# Patient Record
(demographics unavailable — no encounter records)

---

## 2024-10-09 NOTE — PLAN
[FreeTextEntry1] : c/w specialist recommendations. Refer to PT. Pt advised to sign up for [facility] portal to review labs and communicate any questions or concerns directly. Yearly physical and return as needed for illness, medication refills, and new or existing complaints.

## 2024-10-09 NOTE — PHYSICAL EXAM
[No Acute Distress] : no acute distress [Well-Appearing] : well-appearing [No Respiratory Distress] : no respiratory distress  [No Accessory Muscle Use] : no accessory muscle use [Coordination Grossly Intact] : coordination grossly intact [No Focal Deficits] : no focal deficits [Normal Gait] : normal gait [Normal Affect] : the affect was normal [Normal Insight/Judgement] : insight and judgment were intact [de-identified] : right foot boot

## 2024-10-09 NOTE — HISTORY OF PRESENT ILLNESS
[de-identified] : 86-year-old female with past medical history of diabetes type 2, single kidney s/p cancer, and hypertension presents for follow up after ER visit for broken foot. Pt denies CP/SOB, fever/chills, n/v/d/c.

## 2025-02-13 NOTE — HISTORY OF PRESENT ILLNESS
[Patient] : patient [Family Member] : family member [FreeTextEntry2] : CHECO INFANTE is a 86 year F presenting as a new patient to establish care. Accompanied by daughter in law, Dotty ()   Follows with: PCP Dr Tristen Lozano Endocrinology - Dr Rosas Neurology - PA Dotty Ophtho Podiatry Gyn and cardiology - PRN   Active medical problems: DM2 HTN HLD CVA in 2012 c/b L foot fracture c/b Drop foot  - has tried AFO in past and doesnt like them.  osteoporosis - foot fracture Hx kidney cancer - s/p nephrectomy Hyperkalemia Anemia  Anxiety and depression - does not like to go out of a specific region.  GERD Neuropathy OA RBBB   Recent hospitalizations: One year ago - Panic attack with HTN   Past surgical hx: cataract on L L nephrectomy cholecystectomy hysterectomy and oophorectomy appendectomy   Medications: pregabalin 50mg at bedtime Mirtazapine 15mg daily  atorvastatin 20mg daily  losartan 100mg daily  amlodipine 5mg daily  metformin 1g BID  januvia 100mg daily  escitalopram 5mg daily  iron 325mg daily pantoprazole 40mg daily   Primary Pharmacy: [facility] on [address] [address] [facility]   Allergies:  NKDA   Family history: Father and sister - MI Mother - breast cancer   Immunizations: flu shot - does not want PNA - did not get covid - x2 (original) shingles vaccine - has not had.    Vision: Cataracts on R eye Hearing: good Gait/Falls/Assisted devices: Walks w walker (uses inconsistency). One fall in last year.  Skin: vulvar area rash (suspect MAD) Pain: None Appetite: Good, Dentures upper and lower.  BM: regular, occasional incontinence with diarrhea episodes Urine: no issues. continent Sleep: good Mood: anxious. Feels somewhat down and depressed "because I dont do the things I used to do"   Social: Lives alone in a house.  a long time. 3 sons -Daljit Fuentes, and Maxwell [phone] (wife is dotty [phone])  Devin. One indoor cat, feeds the outdoor cats HHA: 8 hours/5 days - through homewatch program ADL: some assistance with bathing and toileting, otherwise independent IADLs: Assistance with finances, independent with medication, some assistance with transportation, assistance with housework, meal prep and shopping, Laundry.  DME: 2 walker, shower chair, cane,  Prior/current profession:  Enjoys: TV, taking care of the cats,  What matters most: "living and seeing my children"   Smoking, alcohol, drugs: Stopped smoking in 1995 - smoked for more than 20 years. No alc or drugs.  HCP/MOLST: PRICE

## 2025-02-13 NOTE — REASON FOR VISIT
[Initial Eval - Existing Diagnosis] : an initial evaluation of an existing diagnosis [Family Member] : family member [Pre-Visit Preparation] : pre-visit preparation was done [FreeTextEntry2] : chart review

## 2025-03-15 NOTE — HISTORY OF PRESENT ILLNESS
[FreeTextEntry1] : This is a 86-year-old female who presents for diabetes follow-up  This is my first visit with this patient. She was previously seen by her Endocrinologist in 2024  Current regimen - Tresiba 12 units once daily, Metformin 1000 mg BID and Januvia   She denies episodes of Hypoglycemia

## 2025-05-28 NOTE — HISTORY OF PRESENT ILLNESS
[In-Place] : has aide services in-place [Patient is stable - had PCP appoinment] : patient is stable - had PCP appointment [FreeTextEntry1] : CVA, OA, anxiety and depression [FreeTextEntry2] : CHECO INFANTE is a 86 year F with PMH DM w/ neuropathy, HTN. HLD, CVA w/ left foot drop, OA, Renal ca s/p nephrectomy, Former smoker (Quit 1985), Hyperkalemia, Anemia, GERD who presents for follow up visit.   Interval Events s/p fall 2 and a half weeks ago with injury to left head and subsequent buzzing and crepitus in neck and left shoulder with active ROM. She was seen in ED at [facility] 5/22 and underwent CT head, CXR, left shoulder xray and discharged home. Incidental liver lesions found on CT chest She states she was dxd with tinnitus. Per pt. has hx of tinnitus for many years which comes and goes but recently worsened post fall.   ROS Vision: Cataracts on R eye Hearing: good. buzzing/ tinnitus in ear Gait/Falls/Assisted devices: Walks w walker (uses inconsistency). One fall in last year.  Skin: vulvar area rash (suspect MAD) Pain: None Appetite: Good, Dentures upper and lower.  BM: regular, occasional incontinence with diarrhea episodes Urine: no issues. continent Sleep: good Mood: anxious. Feels somewhat down and depressed "because I dont do the things I used to do"    Follows with: PCP Dr Tristen Lozano Endocrinology - Dr Rosas Neurology - CHRISTINE Philip Podiatry Gyn and cardiology - PRN   Active medical problems: #DM2- following with endo, metfromin 1g BID, januvia 100mg daily. A1C 7.5 Mar 2025 #osteoporosis - foot fracture - following with endo #HTN / HLD / RBBB - atorva 20mg daily, losartan 100mg daily, amlodipine 5mg daily #CVA in 2012 c/b L foot fracture c/b Drop foot  - has tried AFO in past and doesnt like them. Following w neuro, atorva 20mg daily #Hx kidney cancer - s/p nephrectomy #Hyperkalemia #Anemia - iron daily  #Anxiety and depression - does not like to go out of a specific region. mirtazapine 15mg daily, lexapro 5mg daily #GERD - pantop 40mg daily #Neuropathy / OA - pregabalin 50mg at bedtime   Recent hospitalizations: One year ago - Panic attack with HTN ED visit 5/22/2025  Medications: pregabalin 50mg at bedtime Mirtazapine 15mg daily  atorvastatin 20mg daily  losartan 100mg daily  amlodipine 5mg daily  metformin 1g BID  januvia 100mg daily  escitalopram 5mg daily  iron 325mg daily pantoprazole 40mg daily   Primary Pharmacy: [facility] on [address]   Social: Lives alone in a house.  a long time. 3 sons -Daljit Fuentes, and Maxwell [phone] (wife is nahomy [phone])  Devin. One indoor cat, feeds the outdoor cats HHA: 8 hours/5 days - through homewatch program ADL: some assistance with bathing and toileting, otherwise independent IADLs: Assistance with finances, independent with medication, some assistance with transportation, assistance with housework, meal prep and shopping, Laundry.  DME: 2 walker, shower chair, cane,  Prior/current profession:  Enjoys: TV, taking care of the cats,  What matters most: "living and seeing my children"   Smoking, alcohol, drugs: Stopped smoking in 1995 - smoked for more than 20 years. No alc or drugs.  HCP/MOLST: TBD - patient does not want to answer at this time

## 2025-05-28 NOTE — ASSESSMENT
[FreeTextEntry1] : ED summary and imaging/results reviewed  #Fall- home PT/OT in place. Fall precautions reinforced. Con to monitor  #Liver Lesion - asymptomatic, incidental nodules on CT chest found measuring 1.9 cm and 13 mm along with with 5 mm pancreatic cyst.  F/u PCP re CT/MRI liver and hepatology eval  #Tinnitus- CT head/ neck without acute pathology. f/u ENT. Con to monitor

## 2025-05-28 NOTE — HEALTH RISK ASSESSMENT
[Any fall with injury in past year] : Patient reported fall with injury in the past year [TimeGetUpGo] : 25

## 2025-05-28 NOTE — PHYSICAL EXAM
[Normal TMs] : both tympanic membranes were normal [de-identified] : walks with walker [de-identified] : vulvar area with erythema

## 2025-05-28 NOTE — REVIEW OF SYSTEMS
[Suicidal] : not suicidal [FreeTextEntry9] : as per HPI [de-identified] : as per HPI [de-identified] : as per HPI

## 2025-05-30 NOTE — HISTORY OF PRESENT ILLNESS
[FreeTextEntry2] : 87F seen in office post ER discharge. States earlier in may, she had a trip and  fall. States she hit her head and landed on her left chest/shoulder. Had no laceration at time of injury, but had a large hematoma to her forehead. She was not seen that day, but 1 week after she noted intene ringing in the ear. She worried that she had an injury from prior fall so called housecalls who advised her to go to the ER. She went to [place] where she had multiple imaging studies done. On CT chest she was found to have new liver lesions and hypodensity in the pancreas. Radiologist recommended CT with liver focus or MRI/ MRCP for eval. Patient comes here for next steps. She states she has had cysts on her liver in the past "years ago". Denies any abdominal pain, fever, chills, nausea, vomiting.

## 2025-05-30 NOTE — HISTORY OF PRESENT ILLNESS
[FreeTextEntry2] : 87F seen in office post ER discharge. States earlier in may, she had a trip and  fall. States she hit her head and landed on her left chest/shoulder. Had no laceration at time of injury, but had a large hematoma to her forehead. She was not seen that day, but 1 week after she noted intene ringing in the ear. She worried that she had an injury from prior fall so called housecalls who advised her to go to the ER. She went to [place] where she had multiple imaging studies done. On CT chest she was found to have new liver lesions and hypodensity in the pancreas. Radiologist recommended CT with liver focus or MRI/ MRCP for eval. Patient comes here for next steps. She states she has had cysts on her liver in the past "years ago". Denies any abdominal pain, fever, chills, nausea, vomiting.

## 2025-05-30 NOTE — HISTORY OF PRESENT ILLNESS
[FreeTextEntry2] : 87F seen in office post ER discharge. States earlier in may, she had a trip and  fall. States she hit her head and landed on her left chest/shoulder. Had no laceration at time of injury, but had a large hematoma to her forehead. She was not seen that day, but 1 week after she noted intene ringing in the ear. She worried that she had an injury from prior fall so called housecalls who advised her to go to the ER. She went to [place] where she had multiple imaging studies done. On CT chest she was found to have new liver lesions and hypodensity in the pancreas. Radiologist recommended CT with liver focus or MRI/ MRCP for eval. Patient comes here for next steps. She states she has had cysts on her liver in the past "years ago". Denies any abdominal pain, fever, chills, nausea, vomiting.

## 2025-05-30 NOTE — PLAN
[FreeTextEntry1] : plan discussed above patient to f.u with GI and follow up in 1-2 months for labs

## 2025-05-30 NOTE — REVIEW OF SYSTEMS
[Negative] : Heme/Lymph [Earache] : no earache [Hearing Loss] : no hearing loss [Nosebleed] : no nosebleeds [Hoarseness] : no hoarseness [Nasal Discharge] : no nasal discharge [Sore Throat] : no sore throat [Postnasal Drip] : no postnasal drip [FreeTextEntry4] : left ear tinnitus